# Patient Record
Sex: MALE | Employment: OTHER | URBAN - METROPOLITAN AREA
[De-identification: names, ages, dates, MRNs, and addresses within clinical notes are randomized per-mention and may not be internally consistent; named-entity substitution may affect disease eponyms.]

---

## 2023-06-23 ENCOUNTER — OFFICE VISIT (OUTPATIENT)
Dept: NEUROLOGY | Age: 63
End: 2023-06-23

## 2023-06-23 VITALS
HEIGHT: 66 IN | HEART RATE: 59 BPM | OXYGEN SATURATION: 97 % | WEIGHT: 200 LBS | SYSTOLIC BLOOD PRESSURE: 110 MMHG | DIASTOLIC BLOOD PRESSURE: 70 MMHG | BODY MASS INDEX: 32.14 KG/M2

## 2023-06-23 DIAGNOSIS — M50.30 DEGENERATION OF CERVICAL DISC WITHOUT MYELOPATHY: ICD-10-CM

## 2023-06-23 DIAGNOSIS — Z86.73 HISTORY OF STROKE: Primary | ICD-10-CM

## 2023-06-23 DIAGNOSIS — Z95.2 H/O AORTIC VALVE REPLACEMENT: ICD-10-CM

## 2023-06-23 DIAGNOSIS — F40.240 CLAUSTROPHOBIA: ICD-10-CM

## 2023-06-23 RX ORDER — ISOSORBIDE MONONITRATE 30 MG/1
1 TABLET, EXTENDED RELEASE ORAL DAILY
Qty: 30 TABLET | Refills: 21 | COMMUNITY
Start: 2021-11-26 | End: 2023-09-09

## 2023-06-23 RX ORDER — GABAPENTIN 600 MG/1
1 TABLET ORAL 3 TIMES DAILY
Qty: 90 TABLET | Refills: 11 | COMMUNITY
Start: 2022-11-08 | End: 2023-11-09

## 2023-06-23 RX ORDER — TRAZODONE HYDROCHLORIDE 150 MG/1
150 TABLET ORAL NIGHTLY
COMMUNITY

## 2023-06-23 RX ORDER — HYDROXYZINE 50 MG/1
50 TABLET, FILM COATED ORAL NIGHTLY
COMMUNITY

## 2023-06-23 RX ORDER — DIAZEPAM 5 MG/1
5 TABLET ORAL ONCE
Qty: 1 TABLET | Refills: 0 | Status: SHIPPED | OUTPATIENT
Start: 2023-06-23 | End: 2023-06-23

## 2023-06-23 RX ORDER — MECLIZINE HYDROCHLORIDE 25 MG/1
25 TABLET ORAL
COMMUNITY
Start: 2023-05-24

## 2023-06-23 RX ORDER — ATORVASTATIN CALCIUM 10 MG/1
10 TABLET, FILM COATED ORAL DAILY
COMMUNITY

## 2023-06-23 NOTE — PROGRESS NOTES
6/23/23    Deven Senior  1960    Chief Complaint   Patient presents with    Follow-Up from Hospital     Stroke like symptoms       History of Present Illness  Alvaro Meneses is a 63 y.o. male presenting today for hospital follow-up on 5/30/2023 of: Worsening vertigo and ataxia at home concerning for stroke recrudescence versus new stroke versus BPPV. He has a history of HTN, HLD, CAD s/p MI s/p aortic valve replacement s/p stent placement s/p CABG, prior stroke/right cerebellar stroke 2021. CTA head and neck nonacute. He was unable to tolerate MRI brain due to claustrophobia. He remains on Eliquis, aspirin, Lipitor for secondary stroke prevention. He is compliant with his medications. He was hypertensive on arrival to the emergency department, his blood pressure was 175/87. .    Today he tells me he is still struggling with dizziness described as room spinning sensation. It is triggered by turning his head to either side. Meclizine is somewhat helpful. He has seen an ENT from [facility] he thinks last year he tells me. Current Outpatient Medications   Medication Sig Dispense Refill    gabapentin (NEURONTIN) 600 MG tablet Take 1 tablet by mouth 3 times daily. 90 tablet 11    traZODone (DESYREL) 150 MG tablet Take 1 tablet by mouth nightly      isosorbide mononitrate (IMDUR) 30 MG extended release tablet Take 1 tablet by mouth daily 30 tablet 21    meclizine (ANTIVERT) 25 MG tablet Take 1 tablet by mouth      atorvastatin (LIPITOR) 10 MG tablet Take 1 tablet by mouth daily      hydrOXYzine HCl (ATARAX) 50 MG tablet Take 1 tablet by mouth nightly      diazePAM (VALIUM) 5 MG tablet Take 1 tablet by mouth once for 1 dose. One time dose 45 minutes prior to MRI Max Daily Amount: 5 mg 1 tablet 0     No current facility-administered medications for this visit.        Physical Exam:    Mental Status   Orientation: oriented to person, oriented to place, oriented to problem, and oriented to

## 2023-07-10 ENCOUNTER — HOSPITAL ENCOUNTER (OUTPATIENT)
Dept: MRI IMAGING | Age: 63
Discharge: HOME | End: 2023-07-10
Payer: COMMERCIAL

## 2023-07-10 DIAGNOSIS — M50.30 DEGENERATION OF CERVICAL DISC WITHOUT MYELOPATHY: ICD-10-CM

## 2023-07-10 DIAGNOSIS — Z86.73 HISTORY OF STROKE: ICD-10-CM

## 2023-07-10 PROCEDURE — 72141 MRI NECK SPINE W/O DYE: CPT

## 2023-07-10 PROCEDURE — 70551 MRI BRAIN STEM W/O DYE: CPT

## 2023-07-17 DIAGNOSIS — M54.12 CERVICAL RADICULOPATHY: ICD-10-CM

## 2023-07-17 DIAGNOSIS — Z86.73 HISTORY OF STROKE: ICD-10-CM

## 2023-07-17 DIAGNOSIS — M50.30 DEGENERATION OF CERVICAL DISC WITHOUT MYELOPATHY: ICD-10-CM

## 2023-07-17 DIAGNOSIS — I65.01 VERTEBRAL ARTERY STENOSIS, RIGHT: Primary | ICD-10-CM

## 2023-07-17 DIAGNOSIS — Z95.2 H/O AORTIC VALVE REPLACEMENT: ICD-10-CM

## 2023-08-04 ENCOUNTER — OFFICE VISIT (OUTPATIENT)
Dept: NEUROLOGY | Age: 63
End: 2023-08-04

## 2023-08-04 VITALS
OXYGEN SATURATION: 98 % | WEIGHT: 200 LBS | SYSTOLIC BLOOD PRESSURE: 118 MMHG | BODY MASS INDEX: 32.14 KG/M2 | DIASTOLIC BLOOD PRESSURE: 78 MMHG | HEART RATE: 61 BPM | HEIGHT: 66 IN

## 2023-08-04 DIAGNOSIS — Z95.2 H/O AORTIC VALVE REPLACEMENT: ICD-10-CM

## 2023-08-04 DIAGNOSIS — F40.240 CLAUSTROPHOBIA: ICD-10-CM

## 2023-08-04 DIAGNOSIS — Z86.73 HISTORY OF STROKE: Primary | ICD-10-CM

## 2023-08-04 DIAGNOSIS — M50.30 DEGENERATION OF CERVICAL DISC WITHOUT MYELOPATHY: ICD-10-CM

## 2023-08-04 RX ORDER — MECLIZINE HYDROCHLORIDE 25 MG/1
25 TABLET ORAL 3 TIMES DAILY PRN
Qty: 90 TABLET | Refills: 5 | Status: SHIPPED | OUTPATIENT
Start: 2023-08-04

## 2023-08-04 NOTE — PROGRESS NOTES
8/4/23    Abbey Dears  1960    Chief Complaint   Patient presents with    Results     Pt presents for MRI results        History of Present Illness  Timothy Sahni is a 63 y.o. male presenting today for follow-up of: vertigo/stroke recrudescence. He was seen by Dr. Olimpia Peralta at [facility].  He has a history of prior right frontal and right cerebellar CVA with residual dizziness and gait ataxia. 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 On 5/23/2023, he saw Dr. Olimpia Peralta at [facility] for complaints of acute on chronic dizziness, gait instability and weakness. He has baseline dizziness and gait instability from his prior strokes. MRI brain showed no acute infarction, significant prior infarction lesion of the right frontal operculum in the right cerebellar hemisphere. 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 CTA head and neck demonstrated nonvisualization of V4 segment of the right vertebral artery.   He was complaining of anterior neck pain, MRI of the cervical spine showed mild spinal stenosis and moderate to severe bilateral neuroforaminal narrowing at C5-6, severe bilateral

## 2023-09-11 ENCOUNTER — TELEPHONE (OUTPATIENT)
Dept: NEUROLOGY | Age: 63
End: 2023-09-11

## 2023-09-11 NOTE — TELEPHONE ENCOUNTER
Received call from pt requesting MRI reports be faxed to Dr. Meme Walker at [facility] since they are friends and he would like him to review. Faxed per pt request to #[phone].